# Patient Record
Sex: MALE | Race: WHITE | NOT HISPANIC OR LATINO
[De-identification: names, ages, dates, MRNs, and addresses within clinical notes are randomized per-mention and may not be internally consistent; named-entity substitution may affect disease eponyms.]

---

## 2021-01-29 ENCOUNTER — TRANSCRIPTION ENCOUNTER (OUTPATIENT)
Age: 26
End: 2021-01-29

## 2022-08-15 ENCOUNTER — APPOINTMENT (OUTPATIENT)
Dept: OTOLARYNGOLOGY | Facility: CLINIC | Age: 27
End: 2022-08-15

## 2022-08-15 VITALS
WEIGHT: 190 LBS | BODY MASS INDEX: 26.6 KG/M2 | HEIGHT: 71 IN | SYSTOLIC BLOOD PRESSURE: 129 MMHG | DIASTOLIC BLOOD PRESSURE: 73 MMHG | HEART RATE: 40 BPM

## 2022-08-15 DIAGNOSIS — Z78.9 OTHER SPECIFIED HEALTH STATUS: ICD-10-CM

## 2022-08-15 DIAGNOSIS — H69.83 OTHER SPECIFIED DISORDERS OF EUSTACHIAN TUBE, BILATERAL: ICD-10-CM

## 2022-08-15 DIAGNOSIS — K13.21 LEUKOPLAKIA OF ORAL MUCOSA, INCLUDING TONGUE: ICD-10-CM

## 2022-08-15 DIAGNOSIS — H60.63 UNSPECIFIED CHRONIC OTITIS EXTERNA, BILATERAL: ICD-10-CM

## 2022-08-15 DIAGNOSIS — H61.813 EXOSTOSIS OF EXTERNAL CANAL, BILATERAL: ICD-10-CM

## 2022-08-15 DIAGNOSIS — J34.2 DEVIATED NASAL SEPTUM: ICD-10-CM

## 2022-08-15 DIAGNOSIS — H93.13 TINNITUS, BILATERAL: ICD-10-CM

## 2022-08-15 DIAGNOSIS — M26.623 ARTHRALGIA OF BILATERAL TEMPOROMANDIBULAR JOINT: ICD-10-CM

## 2022-08-15 PROCEDURE — 92588 EVOKED AUDITORY TST COMPLETE: CPT

## 2022-08-15 PROCEDURE — 99204 OFFICE O/P NEW MOD 45 MIN: CPT

## 2022-08-15 PROCEDURE — 92550 TYMPANOMETRY & REFLEX THRESH: CPT

## 2022-08-15 PROCEDURE — 92557 COMPREHENSIVE HEARING TEST: CPT

## 2022-08-15 RX ORDER — HYDROCORTISONE AND ACETIC ACID OTIC 20.75; 10.375 MG/ML; MG/ML
1-2 SOLUTION AURICULAR (OTIC) ONCE
Qty: 1 | Refills: 4 | Status: ACTIVE | COMMUNITY
Start: 2022-08-15 | End: 1900-01-01

## 2022-08-15 RX ORDER — MOMETASONE 50 UG/1
50 SPRAY, METERED NASAL TWICE DAILY
Qty: 3 | Refills: 3 | Status: ACTIVE | COMMUNITY
Start: 2022-08-15 | End: 1900-01-01

## 2022-08-15 NOTE — HISTORY OF PRESENT ILLNESS
[de-identified] : Pt presents today c/o polyp in ear. Pt notes he went to a walk in urgent care for physical where they noticed he had polyps in his ears. Pt notes he surfs and when he gets water in his ears he experiences tinnitus. Pt notes the water hasn't been getting out as easily as it used to. Pt notes he surfs in warm and cold water.

## 2022-08-15 NOTE — DATA REVIEWED
[de-identified] : type A tymp AS; type As tymp AD\par ETF WNL AU\par hearing is -8000 Hz AU\par 1) ent f/u 2) re-eval as per MD 3) consider use of swim plugs

## 2022-08-15 NOTE — ASSESSMENT
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 Welch and reviewed with the patient. TMJ information sheet provided. Use Hydrocortisone - Acetic Acid drops - 2  drops after swimming or surfing. Mometasone - 2 sprays bilaterally BID, spray laterally. FU 1 year.

## 2022-08-15 NOTE — PHYSICAL EXAM
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

## 2022-08-15 NOTE — ADDENDUM
[FreeTextEntry1] : Documented by Marisabel Garcia acting as scribe for Dr. Welch on 08/15/2022.\par \par All Medical record entries made by the scribe were at my, Dr. Welch,direction and personally dictated by me on 08/15/2022. I have reviewed the chart and agree that the record accurately reflects my personal performance of the history, physical exam, assessment and plan. I have also personally directed, reviewed, and agreed with the discharge instructions.

## 2023-08-21 ENCOUNTER — APPOINTMENT (OUTPATIENT)
Dept: OTOLARYNGOLOGY | Facility: CLINIC | Age: 28
End: 2023-08-21